# Patient Record
Sex: FEMALE | Race: OTHER | NOT HISPANIC OR LATINO
[De-identification: names, ages, dates, MRNs, and addresses within clinical notes are randomized per-mention and may not be internally consistent; named-entity substitution may affect disease eponyms.]

---

## 2020-07-29 ENCOUNTER — TRANSCRIPTION ENCOUNTER (OUTPATIENT)
Age: 70
End: 2020-07-29

## 2020-07-30 ENCOUNTER — OUTPATIENT (OUTPATIENT)
Dept: OUTPATIENT SERVICES | Facility: HOSPITAL | Age: 70
LOS: 1 days | Discharge: HOME | End: 2020-07-30

## 2023-06-16 ENCOUNTER — EMERGENCY (EMERGENCY)
Facility: HOSPITAL | Age: 73
LOS: 1 days | Discharge: HOME | End: 2023-06-16
Attending: EMERGENCY MEDICINE
Payer: COMMERCIAL

## 2023-06-16 VITALS
TEMPERATURE: 98 F | DIASTOLIC BLOOD PRESSURE: 83 MMHG | HEART RATE: 58 BPM | SYSTOLIC BLOOD PRESSURE: 162 MMHG | OXYGEN SATURATION: 98 % | RESPIRATION RATE: 19 BRPM

## 2023-06-16 VITALS
WEIGHT: 192.02 LBS | HEART RATE: 60 BPM | RESPIRATION RATE: 20 BRPM | TEMPERATURE: 98 F | OXYGEN SATURATION: 100 % | DIASTOLIC BLOOD PRESSURE: 86 MMHG | HEIGHT: 65 IN | SYSTOLIC BLOOD PRESSURE: 166 MMHG

## 2023-06-16 PROCEDURE — 73562 X-RAY EXAM OF KNEE 3: CPT | Mod: 26,LT

## 2023-06-16 PROCEDURE — 93971 EXTREMITY STUDY: CPT

## 2023-06-16 PROCEDURE — 99284 EMERGENCY DEPT VISIT MOD MDM: CPT | Mod: 25

## 2023-06-16 PROCEDURE — 93971 EXTREMITY STUDY: CPT | Mod: 26,LT

## 2023-06-16 PROCEDURE — 99284 EMERGENCY DEPT VISIT MOD MDM: CPT

## 2023-06-16 PROCEDURE — 73562 X-RAY EXAM OF KNEE 3: CPT

## 2023-06-16 RX ORDER — IBUPROFEN 200 MG
600 TABLET ORAL ONCE
Refills: 0 | Status: COMPLETED | OUTPATIENT
Start: 2023-06-16 | End: 2023-06-16

## 2023-06-16 RX ADMIN — Medication 600 MILLIGRAM(S): at 14:04

## 2023-06-16 NOTE — ED ADULT NURSE NOTE - NSICDXPASTSURGICALHX_GEN_ALL_CORE_FT
PAST SURGICAL HISTORY:  No significant past surgical history      No. CLIF screening performed.  STOP BANG Legend: 0-2 = LOW Risk; 3-4 = INTERMEDIATE Risk; 5-8 = HIGH Risk

## 2023-06-16 NOTE — ED PROVIDER NOTE - OBJECTIVE STATEMENT
72yo female pt with PMHx of HTN, HLD presents to ED with left knee pain today. Reports she noticed left knee swelling, no injury, since 2-3days ago and the knee pain started today. +Worsening pain with walking or climbing stairs. Denies recent traveling. 74yo female pt with PMHx of HTN, HLD presents to ED with left knee pain today. Reports she noticed left knee swelling, no injury, since 2-3days ago and the knee pain started today. +Worsening pain with walking or climbing stairs. Denies recent traveling. Denies sensory changes or weakness to extremities. Denies headache, neck or back pain. Denies CP/SOB/ABD pain or N/V/D. Denies fever, chills, or recent sickness. She took 2 Tylenol this morning.

## 2023-06-16 NOTE — ED PROVIDER NOTE - PHYSICAL EXAMINATION
NAD. VSS. Afebrile. Neck supple. Lungs clear. No spinal tender. ABD soft, non tender. No CVA tender. No pelvic or hip tender. +Left knee; mild swelling, ant tender, and full ROMs. No warmth or eryth. No calf tender. No ankle tender.

## 2023-06-16 NOTE — ED ADULT NURSE NOTE - NSFALLRISKINTERV_ED_ALL_ED
Assistance OOB with selected safe patient handling equipment if applicable/Assistance with ambulation/Communicate fall risk and risk factors to all staff, patient, and family/Monitor gait and stability/Provide visual cue: yellow wristband, yellow gown, etc/Reinforce activity limits and safety measures with patient and family/Call bell, personal items and telephone in reach/Instruct patient to call for assistance before getting out of bed/chair/stretcher/Non-slip footwear applied when patient is off stretcher/[place] to call system/Physically safe environment - no spills, clutter or unnecessary equipment/Purposeful Proactive Rounding/Room/bathroom lighting operational, light cord in reach

## 2023-06-16 NOTE — ED PROVIDER NOTE - ATTENDING APP SHARED VISIT CONTRIBUTION OF CARE
73-year-old female with history of hypertension and diet-controlled diabetes presenting with left knee pain and swelling for the past several days.  Denies fall or trauma.  No redness rashes or other skin changes over leg reported.  Mild swelling reported over leg.  States history of arthritis (likely osteoarthritis denies history of rheumatoid).  No history of gout.  On exam patient is well-appearing in no acute distress left knee with minimal appreciable swelling no significant effusion and has full range of motion of left knee.  Left knee is not warm to touch and there is no overlying erythema or skin changes.  During exam no popliteal or calf tenderness however patient reports that there was previously a bump or lump in popliteal region (no appreciable mass or lump on exam).  DP and PT pulses palpable in left lower extremity.  Cap refill less than 2 seconds in toes, soft compartments throughout LLE and sensation intact throughout LLE.    Overall low suspicion for acute DVT.  Will obtain ultrasound to evaluate for evidence of clot.  Will obtain x-ray to evaluate for fracture though low suspicion for this given no trauma related to symptoms.  Most likely this is related to osteoarthritis.  No exam findings that would suggest either septic or gouty arthritis.

## 2023-06-16 NOTE — ED PROVIDER NOTE - PATIENT PORTAL LINK FT
You can access the FollowMyHealth Patient Portal offered by [facility] by registering at the following website: [email or web address]. By joining [facility]’s FollowMyHealth portal, you will also be able to view your health information using other applications (apps) compatible with our system.

## 2023-06-16 NOTE — ED PROVIDER NOTE - NSFOLLOWUPINSTRUCTIONS_ED_ALL_ED_FT
Please see the information of knee pain.    Elevate the affected knee.    Ice to swelling area; every 2hours for 20minutes.    ACE warp as instructed and use a cane for a comfort.    Keep continue your current medications as prescribed.    Take Ibuprofen (400mg every 8hours with food) or/and Tylenol (2 tablets of 500mg every 8hours) as needed for pain.    Follow up with your orthopedist or Dr. Alonzo for reevaluation, call Monday for appointment.    Return for any concerns, fever, numbness, weakness, or worsening pain.

## 2023-06-16 NOTE — ED PROVIDER NOTE - CARE PROVIDER_API CALL
Karthik Alonzo  Orthopaedic Surgery  [address]  [place] [address]  Phone: [phone]  Fax: [phone]  Follow Up Time:

## 2023-06-16 NOTE — ED ADULT NURSE NOTE - OBJECTIVE STATEMENT
Patient c/o L knee pain and swelling x a few days that worsened last night. Patient states that she also noticed a lump on the back of her knee and has been unable to put pressure on L leg since last night. Patient took tylenol with little relief. No obvious lump on back of knee felt on physical assessment. Denies injury to area, fever, chills, sob, chest pain, n/v/d. Patient A&Ox4, ambulatory at baseline. Family at bedside. No signs of acute distress. Plan of care in progress.

## 2023-06-23 ENCOUNTER — APPOINTMENT (OUTPATIENT)
Dept: ORTHOPEDIC SURGERY | Facility: CLINIC | Age: 73
End: 2023-06-23
Payer: MEDICARE

## 2023-06-23 VITALS
BODY MASS INDEX: 31.99 KG/M2 | OXYGEN SATURATION: 96 % | SYSTOLIC BLOOD PRESSURE: 152 MMHG | WEIGHT: 192 LBS | HEART RATE: 64 BPM | HEIGHT: 65 IN | DIASTOLIC BLOOD PRESSURE: 77 MMHG

## 2023-06-23 PROCEDURE — 99203 OFFICE O/P NEW LOW 30 MIN: CPT

## 2023-06-23 RX ORDER — CELECOXIB 200 MG/1
200 CAPSULE ORAL DAILY
Qty: 14 | Refills: 1 | Status: ACTIVE | COMMUNITY
Start: 2023-06-23 | End: 1900-01-01

## 2023-06-27 ENCOUNTER — NON-APPOINTMENT (OUTPATIENT)
Age: 73
End: 2023-06-27

## 2023-06-29 ENCOUNTER — APPOINTMENT (OUTPATIENT)
Dept: ORTHOPEDIC SURGERY | Facility: CLINIC | Age: 73
End: 2023-06-29

## 2023-06-30 ENCOUNTER — APPOINTMENT (OUTPATIENT)
Dept: ORTHOPEDIC SURGERY | Facility: CLINIC | Age: 73
End: 2023-06-30
Payer: MEDICARE

## 2023-06-30 VITALS
BODY MASS INDEX: 31.99 KG/M2 | WEIGHT: 192 LBS | DIASTOLIC BLOOD PRESSURE: 76 MMHG | HEIGHT: 65 IN | SYSTOLIC BLOOD PRESSURE: 155 MMHG | HEART RATE: 75 BPM | OXYGEN SATURATION: 98 % | TEMPERATURE: 97.8 F

## 2023-06-30 PROCEDURE — 20610 DRAIN/INJ JOINT/BURSA W/O US: CPT

## 2023-07-04 NOTE — PHYSICAL EXAM
[de-identified] : Constitutional: 73 year old female, alert and oriented, cooperative, in no acute distress.\par \par HEENT \par NC/AT.  Appearance: symmetric\par \par Neck/Back\par Straight without deformity or instability.  Good ROM.\par \par Chest/Respiratory \par Respiratory effort: no intercostal retractions or use of accessory muscles. Nonlabored Breathing\par \par Skin \par On inspection, warm and dry without rashes or lesions.\par \par Mental Status: \par Judgment, insight: intact\par Orientation: oriented to time, place, and person\par \par Neurological:\par Sensory and Motor are grossly intact throughout\par \par Left Knee\par \par Inspection:\par     Skin intact, no rashes or lesions\par     No Effusion\par     Non-tender to palpation over tibial tubercle, patella, and pes insertion.\par     Tenderness over the medial and lateral joint lines (anteriorly)\par \par Range of Motion:\par 	Extension - 0 degrees\par 	Flexion - 120 degrees\par 	Extensor lag: None\par \par Stability:\par      Demonstrates no Varus or Valgus instability\par      Negative Anterior or Posterior drawer.\par      Negative Lachman's\par      Negative McMurrays\par \par Patella: stable, tracks well. \par \par Neurologic Exam\par     Motor intact including 5/5 Extensor Hallucis Longus, 5/5 Flexor Hallucis Longus, 5/5 Tibialis Anterior and 5/5 Gastrocnemius\par     Sensation Intact to Light Touch including Saphenous, Sural, Superficial Peroneal, Deep Peroneal, Tibial nerve distributions\par \par Vascular Exam\par     Foot is warm and well perfused with 2+ Dorsalis Pedis Pulse \par \par No pain with range of motion of the bilateral hips or right knee. No lumbar paraspinal muscle tenderness.  [de-identified] : \par ACC: [id number] EXAM: DUPLEX EXT VEINS LOWER LT ORDERED BY: JOY TIWARI\par \par PROCEDURE DATE: 06/16/2023\par \par \par \par INTERPRETATION: CLINICAL INFORMATION: Left lower extremity pain and swelling.\par \par COMPARISON: None available.\par \par TECHNIQUE: Duplex sonography of the LEFT LOWER extremity with color and spectral Doppler, with and without compression.\par \par FINDINGS:\par \par There is normal compressibility of the left common femoral, femoral and popliteal veins. No calf vein thrombosis is detected.\par \par The contralateral common femoral vein is patent.\par \par Doppler examination shows normal spontaneous and phasic flow.\par \par IMPRESSION:\par \par No evidence of left lower extremity deep venous thrombosis.\par \par --- End of Report ---\par \par POLLY DU MD; Attending Radiologist\par This document has been electronically signed. Jun 16 2023 4:35PM\par \par \par \par ACC: [id number] EXAM: XR KNEE AP LAT OBL 3 VIEWS LT ORDERED BY: JOY TIWARI\par \par PROCEDURE DATE: 06/16/2023\par \par \par \par INTERPRETATION: CLINICAL INDICATION: Left knee pain and swelling\par \par TECHNIQUE: 3 views of the left knee\par \par COMPARISON: No similar prior comparisons available.\par \par FINDINGS:\par Small knee joint effusion. Chondrocalcinosis with joint space narrowing greatest at the patellofemoral compartment consistent with CPPD arthropathy. There is mild spurring at the quadriceps insertion. No acute fracture or dislocation.\par \par IMPRESSION:\par No acute fracture or dislocation. 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 Jun 16 2023 3:43PM

## 2023-07-04 NOTE — DISCUSSION/SUMMARY
[de-identified] : Stephanie Best is a 73-year-old female presents to the office for follow-up of her left knee pain.  Patient has been experiencing left knee pain since 6/15/2023.  X-rays from the ER showed moderate CPPD arthropathy.  Ultrasound duplex was negative.  Examination showed some tenderness over the knee, but otherwise good knee range of motion.  Discussed with the patient the examination and imaging findings.  Discussed with patient that her knee pain is possibly secondary to her degenerative joint disease and CPPD arthropathy.  Discussed with patient management of her knee pain at this time, including physical therapy, anti-inflammatories, and injections.  Patient was given referral to physical therapy.  She was given a prescription for Celebrex 200 mg daily for 14 days.  Patient would like a left knee corticosteroid injection.  Discussed the risks of corticosteroid injections.  Left knee corticosteroid injection was given using aseptic technique.  Patient tolerated the procedure well.  Patient will follow-up in 3 months for reevaluation and management.  Patient understanding and in agreement with the plan.  All questions answered.\par \par Plan:\par -Left knee corticosteroid injection given\par -Physical therapy\par -Celebrex 200 mg daily for 14 days\par -Follow-up in 3 months for reevaluation and management\par \par \par Procedure Note: Left knee corticosteroid injection\par \par A Left Knee corticosteroid injection was given today. Risk and benefits of the injection were discussed, including but not limited to infection, fat atrophy, skin discoloration, failure to achieve desired results and elevated blood sugars. \par The area was prepped in the usual sterile fashion. The injection was given with 1 cc (40 mg) Methylprednisolone and 2 cc 0.25% Bupivacaine and the patient tolerated it well. \par \par Risk of cortisone injection are minimal but present. There is the rare risk of joint infection, skin and soft tissue thinning or whitening of the skin surrounding the injection site, thinning of nearby bone, or the risk of elevated blood glucose in diabetics. Diabetics receiving steroid injection should follow their blood sugars very closely for several days after receiving the injections.  In the event of uncontrolled elevated blood glucose, they should seek medical attention.\par \par There's some concern that repeated use of cortisone shots may cause deterioration of the cartilage within a joint. For this reason, doctors typically limit the number of cortisone shots in a joint. The limit varies depending on the joint and the reason for treatment.\par \par Cortisone shots usually include a corticosteroid medication and a local anesthetic. The local anesthetic helps to numb the joint at the time of the injection and last for several hours. The cortisone acts to relieve pain and inflammation but may take several days to begin to work. Some people do experience a cortisone flare after the injection and may have increased pain for 2 to 3 days after the injection, and then pain can begin to improve.\par \par Patient was instructed to call or return for any signs or symptoms of infection after an injection including increased pain, swelling, redness or fevers.

## 2023-07-04 NOTE — DISCUSSION/SUMMARY
[de-identified] : Stephanie Best is a 73-year-old female presents to the office for evaluation of her left knee pain.  Patient has been experiencing left knee pain since 6/15/2023.  X-rays from the ER showed moderate CPPD arthropathy.  Ultrasound duplex was negative.  Examination showed some tenderness over the knee, but otherwise good knee range of motion.  Discussed with the patient the examination and imaging findings.  Discussed with patient that her knee pain is possibly secondary to her degenerative joint disease and CPPD arthropathy.  Discussed with patient management of her knee pain at this time, including physical therapy, anti-inflammatories, and injections.  Patient was given referral to physical therapy.  She was given a prescription for Celebrex 200 mg daily for 14 days.  Patient does not want a knee injection at this time.  Patient will follow-up in 4 to 6 weeks for reevaluation and management.  Patient understanding and in agreement with the plan.  All questions answered.\par \par Plan:\par -Physical therapy\par -Celebrex 200 mg daily for 14 days\par -Follow-up in 4 to 6 weeks for reevaluation and management

## 2023-07-04 NOTE — HISTORY OF PRESENT ILLNESS
[de-identified] : Paula Best is a 73-year-old female who presents the office for evaluation of her left knee pain.  Patient has been experiencing left knee pain since 6/15/2023.  She went to the ER on 6/16/2023, where x-rays and duplex were negative.  She does some walking day before.  Pain has improved, but she continues to have pain with weightbearing.  Patient has tried Tylenol, with some relief.  She has not previously tried physical therapy.  She had a corticosteroid injection x2 in 2014.  No hip or back pain.  No history of knee pain recently.  No falls or injuries.\par \par History: HTN, HLD

## 2023-07-04 NOTE — HISTORY OF PRESENT ILLNESS
[de-identified] : 6/30/2023\par \par Stephanie Best presented to the office for follow-up of her left knee pain.  Patient continues to have left knee pain.  She states that the Celebrex gave some relief.  Pain continues to be located over the anterior knee.  No falls or injuries.  Patient would like a left knee corticosteroid injection.\par \par 6/23/2023\par Paula Best is a 73-year-old female who presents the office for evaluation of her left knee pain.  Patient has been experiencing left knee pain since 6/15/2023.  She went to the ER on 6/16/2023, where x-rays and duplex were negative.  She does some walking day before.  Pain has improved, but she continues to have pain with weightbearing.  Patient has tried Tylenol, with some relief.  She has not previously tried physical therapy.  She had a corticosteroid injection x2 in 2014.  No hip or back pain.  No history of knee pain recently.  No falls or injuries.\par \par History: HTN, HLD

## 2023-07-04 NOTE — PHYSICAL EXAM
[de-identified] : Constitutional: 73 year old female, alert and oriented, cooperative, in no acute distress.\par \par HEENT \par NC/AT.  Appearance: symmetric\par \par Neck/Back\par Straight without deformity or instability.  Good ROM.\par \par Chest/Respiratory \par Respiratory effort: no intercostal retractions or use of accessory muscles. Nonlabored Breathing\par \par Skin \par On inspection, warm and dry without rashes or lesions.\par \par Mental Status: \par Judgment, insight: intact\par Orientation: oriented to time, place, and person\par \par Neurological:\par Sensory and Motor are grossly intact throughout\par \par Left Knee\par \par Inspection:\par     Skin intact, no rashes or lesions\par     No Effusion\par     Non-tender to palpation over tibial tubercle, patella, and pes insertion.\par     Tenderness over the medial and lateral joint lines (anteriorly)\par \par Range of Motion:\par 	Extension - 0 degrees\par 	Flexion - 120 degrees\par 	Extensor lag: None\par \par Stability:\par      Demonstrates no Varus or Valgus instability\par      Negative Anterior or Posterior drawer.\par      Negative Lachman's\par      Negative McMurrays\par \par Patella: stable, tracks well. \par \par Neurologic Exam\par     Motor intact including 5/5 Extensor Hallucis Longus, 5/5 Flexor Hallucis Longus, 5/5 Tibialis Anterior and 5/5 Gastrocnemius\par     Sensation Intact to Light Touch including Saphenous, Sural, Superficial Peroneal, Deep Peroneal, Tibial nerve distributions\par \par Vascular Exam\par     Foot is warm and well perfused with 2+ Dorsalis Pedis Pulse \par \par No pain with range of motion of the bilateral hips or right knee. No lumbar paraspinal muscle tenderness.  [de-identified] : \par ACC: [id number] EXAM: DUPLEX EXT VEINS LOWER LT ORDERED BY: JOY TIWARI\par \par PROCEDURE DATE: 06/16/2023\par \par \par \par INTERPRETATION: CLINICAL INFORMATION: Left lower extremity pain and swelling.\par \par COMPARISON: None available.\par \par TECHNIQUE: Duplex sonography of the LEFT LOWER extremity with color and spectral Doppler, with and without compression.\par \par FINDINGS:\par \par There is normal compressibility of the left common femoral, femoral and popliteal veins. No calf vein thrombosis is detected.\par \par The contralateral common femoral vein is patent.\par \par Doppler examination shows normal spontaneous and phasic flow.\par \par IMPRESSION:\par \par No evidence of left lower extremity deep venous thrombosis.\par \par --- End of Report ---\par \par POLLY DU MD; Attending Radiologist\par This document has been electronically signed. Jun 16 2023 4:35PM\par \par \par \par ACC: [id number] EXAM: XR KNEE AP LAT OBL 3 VIEWS LT ORDERED BY: JOY TIWARI\par \par PROCEDURE DATE: 06/16/2023\par \par \par \par INTERPRETATION: CLINICAL INDICATION: Left knee pain and swelling\par \par TECHNIQUE: 3 views of the left knee\par \par COMPARISON: No similar prior comparisons available.\par \par FINDINGS:\par Small knee joint effusion. Chondrocalcinosis with joint space narrowing greatest at the patellofemoral compartment consistent with CPPD arthropathy. There is mild spurring at the quadriceps insertion. No acute fracture or dislocation.\par \par IMPRESSION:\par No acute fracture or dislocation. 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 Jun 16 2023 3:43PM

## 2023-07-05 ENCOUNTER — NON-APPOINTMENT (OUTPATIENT)
Age: 73
End: 2023-07-05

## 2023-07-06 ENCOUNTER — NON-APPOINTMENT (OUTPATIENT)
Age: 73
End: 2023-07-06

## 2023-07-21 ENCOUNTER — APPOINTMENT (OUTPATIENT)
Dept: ORTHOPEDIC SURGERY | Facility: CLINIC | Age: 73
End: 2023-07-21
Payer: MEDICARE

## 2023-07-21 VITALS
DIASTOLIC BLOOD PRESSURE: 84 MMHG | HEART RATE: 65 BPM | HEIGHT: 65 IN | SYSTOLIC BLOOD PRESSURE: 167 MMHG | WEIGHT: 190 LBS | BODY MASS INDEX: 31.65 KG/M2

## 2023-07-21 DIAGNOSIS — M11.20 OTHER CHONDROCALCINOSIS, UNSPECIFIED SITE: ICD-10-CM

## 2023-07-21 PROCEDURE — 99213 OFFICE O/P EST LOW 20 MIN: CPT

## 2023-07-27 NOTE — HISTORY OF PRESENT ILLNESS
[de-identified] : 7/21/2023\par \cleopatra Best presents to the office for follow-up of her left knee pain.  Pain is worse with walking and standing.  She has pain with twisting the knee.  Pain is located over the anterior knee.  She can have some pain rating up to the thigh.  She states that the injection only helped a small amount.  She states that the pain is slightly improved compared to prior to the injection.  She started physical therapy and did 2 sessions.  She has tried Tylenol, with some relief.\par \par 6/30/2023\par \cleopatra Best presented to the office for follow-up of her left knee pain.  Patient continues to have left knee pain.  She states that the Celebrex gave some relief.  Pain continues to be located over the anterior knee.  No falls or injuries.  Patient would like a left knee corticosteroid injection.\par \par 6/23/2023\cleopatra Best is a 73-year-old female who presents the office for evaluation of her left knee pain.  Patient has been experiencing left knee pain since 6/15/2023.  She went to the ER on 6/16/2023, where x-rays and duplex were negative.  She does some walking day before.  Pain has improved, but she continues to have pain with weightbearing.  Patient has tried Tylenol, with some relief.  She has not previously tried physical therapy.  She had a corticosteroid injection x2 in 2014.  No hip or back pain.  No history of knee pain recently.  No falls or injuries.\par \par History: HTN, HLD

## 2023-07-27 NOTE — DISCUSSION/SUMMARY
[de-identified] : Stephanie Best is a 73-year-old female presents to the office for follow-up of her left knee pain.  Patient has continued to experience left knee pain.  X-rays from the ER showed moderate CPPD arthropathy.  Ultrasound duplex was negative.  Examination showed some tenderness over the knee, but otherwise good knee range of motion.  Discussed with the patient the examination and imaging findings.  Discussed with patient that her knee pain is possibly secondary to her degenerative joint disease, patella osteoarthritis, and CPPD arthropathy.  Discussed with patient management of her knee pain at this time, including physical therapy, anti-inflammatories, and injections.  Patient will continue her physical therapy.  She will take Tylenol as needed for her pain control.  Discussed that due to her findings of potential CPPD arthropathy, it would be beneficial to undergo further evaluation by rheumatology.  Patient was given referral to rheumatology.  Patient will follow-up in 2 months for reevaluation and management.  Patient understanding and in agreement with plan.  All questions answered.\par \par Plan:\par -Physical therapy\par -Tylenol as needed for pain control\par -Follow up with Rheumatology\par -Follow-up in 2 months for reevaluation and management

## 2023-07-27 NOTE — PHYSICAL EXAM
[de-identified] : Constitutional: 73 year old female, alert and oriented, cooperative, in no acute distress.\par \par HEENT \par NC/AT.  Appearance: symmetric\par \par Neck/Back\par Straight without deformity or instability.  Good ROM.\par \par Chest/Respiratory \par Respiratory effort: no intercostal retractions or use of accessory muscles. Nonlabored Breathing\par \par Skin \par On inspection, warm and dry without rashes or lesions.\par \par Mental Status: \par Judgment, insight: intact\par Orientation: oriented to time, place, and person\par \par Neurological:\par Sensory and Motor are grossly intact throughout\par \par Left Knee\par \par Inspection:\par     Skin intact, no rashes or lesions\par     No Effusion\par     Non-tender to palpation over tibial tubercle, medial joint line, and pes insertion.\par     Tenderness over the lateral joint line (anteriorly and midcoronal line)\par     Tenderness over the patella\par \par Range of Motion:\par 	Extension - 0 degrees\par 	Flexion - 120 degrees\par 	Extensor lag: None\par \par Stability:\par      Demonstrates no Varus or Valgus instability\par      Negative Anterior or Posterior drawer.\par      Negative Lachman's\par      Negative McMurrays\par \par Patella: stable, tracks well. \par \par Neurologic Exam\par     Motor intact including 5/5 Extensor Hallucis Longus, 5/5 Flexor Hallucis Longus, 5/5 Tibialis Anterior and 5/5 Gastrocnemius\par     Sensation Intact to Light Touch including Saphenous, Sural, Superficial Peroneal, Deep Peroneal, Tibial nerve distributions\par \par Vascular Exam\par     Foot is warm and well perfused with 2+ Dorsalis Pedis Pulse \par \par No pain with range of motion of the bilateral hips or right knee. No lumbar paraspinal muscle tenderness.  [de-identified] : \par ACC: [id number] EXAM: DUPLEX EXT VEINS LOWER LT ORDERED BY: JOY TIWARI\par \par PROCEDURE DATE: 06/16/2023\par \par \par \par INTERPRETATION: CLINICAL INFORMATION: Left lower extremity pain and swelling.\par \par COMPARISON: None available.\par \par TECHNIQUE: Duplex sonography of the LEFT LOWER extremity with color and spectral Doppler, with and without compression.\par \par FINDINGS:\par \par There is normal compressibility of the left common femoral, femoral and popliteal veins. No calf vein thrombosis is detected.\par \par The contralateral common femoral vein is patent.\par \par Doppler examination shows normal spontaneous and phasic flow.\par \par IMPRESSION:\par \par No evidence of left lower extremity deep venous thrombosis.\par \par --- End of Report ---\par \par POLLY DU MD; Attending Radiologist\par This document has been electronically signed. Jun 16 2023 4:35PM\par \par \par \par ACC: [id number] EXAM: XR KNEE AP LAT OBL 3 VIEWS LT ORDERED BY: JOY TIWARI\par \par PROCEDURE DATE: 06/16/2023\par \par \par \par INTERPRETATION: CLINICAL INDICATION: Left knee pain and swelling\par \par TECHNIQUE: 3 views of the left knee\par \par COMPARISON: No similar prior comparisons available.\par \par FINDINGS:\par Small knee joint effusion. Chondrocalcinosis with joint space narrowing greatest at the patellofemoral compartment consistent with CPPD arthropathy. There is mild spurring at the quadriceps insertion. No acute fracture or dislocation.\par \par IMPRESSION:\par No acute fracture or dislocation. 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 Jun 16 2023 3:43PM

## 2023-09-07 RX ORDER — HYLAN G-F 20 16MG/2ML
16 SYRINGE (ML) INTRAARTICULAR
Qty: 1 | Refills: 0 | Status: ACTIVE | COMMUNITY
Start: 2023-09-07

## 2023-09-21 ENCOUNTER — APPOINTMENT (OUTPATIENT)
Dept: ORTHOPEDIC SURGERY | Facility: CLINIC | Age: 73
End: 2023-09-21
Payer: MEDICARE

## 2023-09-21 PROCEDURE — 20610 DRAIN/INJ JOINT/BURSA W/O US: CPT | Mod: LT

## 2023-09-28 ENCOUNTER — APPOINTMENT (OUTPATIENT)
Dept: ORTHOPEDIC SURGERY | Facility: CLINIC | Age: 73
End: 2023-09-28
Payer: MEDICARE

## 2023-09-28 VITALS
DIASTOLIC BLOOD PRESSURE: 95 MMHG | WEIGHT: 190 LBS | SYSTOLIC BLOOD PRESSURE: 173 MMHG | HEIGHT: 65 IN | BODY MASS INDEX: 31.65 KG/M2

## 2023-09-28 PROCEDURE — 20610 DRAIN/INJ JOINT/BURSA W/O US: CPT | Mod: LT

## 2023-10-03 ENCOUNTER — APPOINTMENT (OUTPATIENT)
Dept: ORTHOPEDIC SURGERY | Facility: CLINIC | Age: 73
End: 2023-10-03
Payer: MEDICARE

## 2023-10-03 DIAGNOSIS — M17.12 UNILATERAL PRIMARY OSTEOARTHRITIS, LEFT KNEE: ICD-10-CM

## 2023-10-03 PROCEDURE — 20610 DRAIN/INJ JOINT/BURSA W/O US: CPT | Mod: LT

## 2023-10-04 PROBLEM — M17.12 PRIMARY OSTEOARTHRITIS OF LEFT KNEE: Status: ACTIVE | Noted: 2023-06-23

## 2023-12-11 ENCOUNTER — NON-APPOINTMENT (OUTPATIENT)
Age: 73
End: 2023-12-11

## 2024-11-22 ENCOUNTER — APPOINTMENT (OUTPATIENT)
Dept: PODIATRY | Facility: CLINIC | Age: 74
End: 2024-11-22

## 2024-11-22 DIAGNOSIS — Z86.79 PERSONAL HISTORY OF OTHER DISEASES OF THE CIRCULATORY SYSTEM: ICD-10-CM

## 2024-11-22 PROCEDURE — 99203 OFFICE O/P NEW LOW 30 MIN: CPT | Mod: 25

## 2024-11-22 PROCEDURE — 73610 X-RAY EXAM OF ANKLE: CPT | Mod: LT

## 2024-11-22 RX ORDER — ATORVASTATIN CALCIUM 40 MG/1
40 TABLET, FILM COATED ORAL
Refills: 0 | Status: ACTIVE | COMMUNITY

## 2024-11-22 RX ORDER — AMLODIPINE AND VALSARTAN 10; 160 MG/1; MG/1
10-160 TABLET, FILM COATED ORAL
Refills: 0 | Status: ACTIVE | COMMUNITY

## 2024-11-22 RX ORDER — ASPIRIN 81 MG
81 TABLET,CHEWABLE ORAL
Refills: 0 | Status: ACTIVE | COMMUNITY

## 2024-11-27 ENCOUNTER — APPOINTMENT (OUTPATIENT)
Dept: VASCULAR SURGERY | Facility: CLINIC | Age: 74
End: 2024-11-27

## 2024-11-27 VITALS
HEART RATE: 93 BPM | BODY MASS INDEX: 30.49 KG/M2 | WEIGHT: 183 LBS | SYSTOLIC BLOOD PRESSURE: 147 MMHG | DIASTOLIC BLOOD PRESSURE: 94 MMHG | HEIGHT: 65 IN

## 2024-11-27 DIAGNOSIS — M25.572 PAIN IN LEFT ANKLE AND JOINTS OF LEFT FOOT: ICD-10-CM

## 2024-11-27 DIAGNOSIS — R60.0 LOCALIZED EDEMA: ICD-10-CM

## 2024-11-27 DIAGNOSIS — M25.472 EFFUSION, LEFT ANKLE: ICD-10-CM

## 2024-11-27 DIAGNOSIS — M11.20 OTHER CHONDROCALCINOSIS, UNSPECIFIED SITE: ICD-10-CM

## 2024-11-27 DIAGNOSIS — M71.21 SYNOVIAL CYST OF POPLITEAL SPACE [BAKER], RIGHT KNEE: ICD-10-CM

## 2024-11-27 DIAGNOSIS — I87.2 VENOUS INSUFFICIENCY (CHRONIC) (PERIPHERAL): ICD-10-CM

## 2024-11-27 DIAGNOSIS — M19.172 POST-TRAUMATIC OSTEOARTHRITIS, LEFT ANKLE AND FOOT: ICD-10-CM

## 2024-11-27 PROCEDURE — 93970 EXTREMITY STUDY: CPT

## 2024-11-27 PROCEDURE — 99204 OFFICE O/P NEW MOD 45 MIN: CPT | Mod: 25

## 2025-06-17 ENCOUNTER — APPOINTMENT (OUTPATIENT)
Dept: CARE COORDINATION | Facility: HOME HEALTH | Age: 75
End: 2025-06-17

## 2025-06-17 VITALS — WEIGHT: 179 LBS | BODY MASS INDEX: 29.82 KG/M2 | HEIGHT: 65 IN

## 2025-06-17 PROCEDURE — 99348 HOME/RES VST EST LOW MDM 30: CPT | Mod: 93

## 2025-06-17 RX ORDER — METFORMIN HYDROCHLORIDE 500 MG/1
500 TABLET, COATED ORAL
Qty: 30 | Refills: 0 | Status: ACTIVE | COMMUNITY
Start: 2025-06-17

## 2025-08-28 ENCOUNTER — OUTPATIENT (OUTPATIENT)
Dept: OUTPATIENT SERVICES | Facility: HOSPITAL | Age: 75
LOS: 1 days | End: 2025-08-28
Payer: MEDICARE

## 2025-08-28 VITALS
OXYGEN SATURATION: 98 % | DIASTOLIC BLOOD PRESSURE: 84 MMHG | WEIGHT: 177.91 LBS | RESPIRATION RATE: 17 BRPM | HEIGHT: 65 IN | TEMPERATURE: 97 F | HEART RATE: 61 BPM | SYSTOLIC BLOOD PRESSURE: 169 MMHG

## 2025-08-28 VITALS
DIASTOLIC BLOOD PRESSURE: 66 MMHG | RESPIRATION RATE: 19 BRPM | HEART RATE: 71 BPM | SYSTOLIC BLOOD PRESSURE: 123 MMHG | OXYGEN SATURATION: 98 %

## 2025-08-28 DIAGNOSIS — R10.9 UNSPECIFIED ABDOMINAL PAIN: ICD-10-CM

## 2025-08-28 PROCEDURE — 88305 TISSUE EXAM BY PATHOLOGIST: CPT | Mod: 26

## 2025-08-28 RX ORDER — ATORVASTATIN CALCIUM 80 MG/1
0 TABLET, FILM COATED ORAL
Refills: 0 | DISCHARGE

## 2025-09-04 LAB — SURGICAL PATHOLOGY STUDY: SIGNIFICANT CHANGE UP

## (undated) DEVICE — BIOPSY FORCEP RADIAL JAW 4 STANDARD WITH NEEDLE

## (undated) DEVICE — CATH IV SAFE BC 22G X 1" (BLUE)

## (undated) DEVICE — DRSG BANDAID 0.75X3"

## (undated) DEVICE — ELCTR GROUNDING PAD ADULT COVIDIEN

## (undated) DEVICE — PACK IV START WITH CHG

## (undated) DEVICE — TUBING SUCTION NONCONDUCTIVE 6MM X 12FT

## (undated) DEVICE — LUBRICATING JELLY HR ONE SHOT 3G

## (undated) DEVICE — BASIN EMESIS 10IN GRADUATED MAUVE

## (undated) DEVICE — TUBING IV SET GRAVITY 3Y 100" MACRO

## (undated) DEVICE — BIOPSY FORCEP COLD DISP

## (undated) DEVICE — SALIVA EJECTOR (BLUE)

## (undated) DEVICE — ELCTR ECG CONDUCTIVE ADHESIVE

## (undated) DEVICE — DRSG CURITY GAUZE SPONGE 4 X 4" 12-PLY NON-STERILE

## (undated) DEVICE — CONTAINER FORMALIN 80ML YELLOW

## (undated) DEVICE — TUBING MEDI-VAC W MAXIGRIP CONNECTORS 1/4"X6'

## (undated) DEVICE — DRSG 2X2

## (undated) DEVICE — GOWN LG